# Patient Record
Sex: MALE
[De-identification: names, ages, dates, MRNs, and addresses within clinical notes are randomized per-mention and may not be internally consistent; named-entity substitution may affect disease eponyms.]

---

## 2020-10-13 ENCOUNTER — RX ONLY (RX ONLY)
Age: 35
End: 2020-10-13

## 2021-02-17 ENCOUNTER — RX ONLY (RX ONLY)
Age: 36
End: 2021-02-17

## 2021-08-12 ENCOUNTER — RX ONLY (RX ONLY)
Age: 36
End: 2021-08-12

## 2021-09-29 ENCOUNTER — RX ONLY (RX ONLY)
Age: 36
End: 2021-09-29

## 2021-12-01 ENCOUNTER — RX ONLY (RX ONLY)
Age: 36
End: 2021-12-01

## 2022-08-01 ENCOUNTER — APPOINTMENT (OUTPATIENT)
Dept: URBAN - METROPOLITAN AREA CLINIC 205 | Age: 37
Setting detail: DERMATOLOGY
End: 2022-08-07

## 2022-08-01 DIAGNOSIS — L40.0 PSORIASIS VULGARIS: ICD-10-CM

## 2022-08-01 DIAGNOSIS — B35.4 TINEA CORPORIS: ICD-10-CM

## 2022-08-01 PROCEDURE — OTHER INVENTORY: OTHER

## 2022-08-01 PROCEDURE — OTHER PRESCRIPTION: OTHER

## 2022-08-01 PROCEDURE — 99213 OFFICE O/P EST LOW 20 MIN: CPT

## 2022-08-01 PROCEDURE — OTHER COUNSELING: OTHER

## 2022-08-01 PROCEDURE — OTHER TREATMENT REGIMEN: OTHER

## 2022-08-01 RX ORDER — HALOBETASOL PROPIONATE AND TAZAROTENE .1; .45 MG/G; MG/G
LOTION TOPICAL
Qty: 100 | Refills: 0 | Status: ERX | COMMUNITY
Start: 2022-08-01

## 2022-08-01 NOTE — PROCEDURE: COUNSELING
Detail Level: Detailed
Patient Specific Counseling (Will Not Stick From Patient To Patient): He declined a biopsy today.  I lean toward psoriasis as a diagnosis.  If he were to consider pursuing a systemic treatment for psoriasis we would need to verify this diagnosis with a biopsy.  I encouraged him to talk to his pcp as they monitor systemic effects of psoriasis like arthritis metabolic syndrome heart disease etc. he voiced understanding.